# Patient Record
Sex: FEMALE | Race: WHITE | Employment: FULL TIME | URBAN - METROPOLITAN AREA
[De-identification: names, ages, dates, MRNs, and addresses within clinical notes are randomized per-mention and may not be internally consistent; named-entity substitution may affect disease eponyms.]

---

## 2017-12-01 ENCOUNTER — HOSPITAL ENCOUNTER (OUTPATIENT)
Dept: MAMMOGRAPHY | Age: 52
Discharge: HOME | End: 2017-12-01
Attending: OBSTETRICS & GYNECOLOGY
Payer: COMMERCIAL

## 2017-12-01 DIAGNOSIS — Z12.31 VISIT FOR SCREENING MAMMOGRAM: ICD-10-CM

## 2017-12-01 PROCEDURE — 77067 SCR MAMMO BI INCL CAD: CPT

## 2019-06-25 ENCOUNTER — HOSPITAL ENCOUNTER (OUTPATIENT)
Dept: MAMMOGRAPHY | Age: 54
Discharge: HOME | End: 2019-06-25
Attending: OBSTETRICS & GYNECOLOGY
Payer: COMMERCIAL

## 2019-06-25 DIAGNOSIS — Z12.31 VISIT FOR SCREENING MAMMOGRAM: ICD-10-CM

## 2019-06-25 PROCEDURE — 77067 SCR MAMMO BI INCL CAD: CPT

## 2019-06-28 NOTE — PROGRESS NOTES
Patient notified and she said she could not afford the MRI at this time. She said she will call back and schedule the test if it becomes possible for to afford the procedure.

## 2020-09-17 ENCOUNTER — HOSPITAL ENCOUNTER (OUTPATIENT)
Dept: MAMMOGRAPHY | Age: 55
Discharge: HOME | End: 2020-09-17
Attending: OBSTETRICS & GYNECOLOGY
Payer: COMMERCIAL

## 2020-09-17 DIAGNOSIS — Z12.31 VISIT FOR SCREENING MAMMOGRAM: ICD-10-CM

## 2020-09-17 PROCEDURE — 77067 SCR MAMMO BI INCL CAD: CPT

## 2020-10-08 NOTE — PROGRESS NOTES
Patient notified of results. MRI recommended but patient could not afford the test. She will let us know if she changes her mind.

## 2021-08-27 ENCOUNTER — TRANSCRIBE ORDER (OUTPATIENT)
Dept: SCHEDULING | Age: 56
End: 2021-08-27

## 2021-08-27 DIAGNOSIS — Z12.31 SCREENING MAMMOGRAM FOR HIGH-RISK PATIENT: Primary | ICD-10-CM

## 2021-10-22 ENCOUNTER — HOSPITAL ENCOUNTER (OUTPATIENT)
Dept: MAMMOGRAPHY | Age: 56
Discharge: HOME | End: 2021-10-22
Attending: OBSTETRICS & GYNECOLOGY
Payer: COMMERCIAL

## 2021-10-22 DIAGNOSIS — Z12.31 SCREENING MAMMOGRAM FOR HIGH-RISK PATIENT: ICD-10-CM

## 2021-10-22 PROCEDURE — 77063 BREAST TOMOSYNTHESIS BI: CPT

## 2022-10-25 ENCOUNTER — OFFICE VISIT (OUTPATIENT)
Dept: GYNECOLOGY | Age: 57
End: 2022-10-25
Payer: COMMERCIAL

## 2022-10-25 ENCOUNTER — HOSPITAL ENCOUNTER (OUTPATIENT)
Dept: MAMMOGRAPHY | Age: 57
Discharge: HOME | End: 2022-10-28
Payer: COMMERCIAL

## 2022-10-25 VITALS
BODY MASS INDEX: 26.29 KG/M2 | WEIGHT: 154 LBS | SYSTOLIC BLOOD PRESSURE: 122 MMHG | DIASTOLIC BLOOD PRESSURE: 80 MMHG | HEIGHT: 64 IN

## 2022-10-25 DIAGNOSIS — Z01.419 ENCOUNTER FOR WELL WOMAN EXAM WITH ROUTINE GYNECOLOGICAL EXAM: Primary | ICD-10-CM

## 2022-10-25 DIAGNOSIS — Z12.31 SCREENING MAMMOGRAM FOR HIGH-RISK PATIENT: ICD-10-CM

## 2022-10-25 DIAGNOSIS — Z12.4 CERVICAL CANCER SCREENING: ICD-10-CM

## 2022-10-25 PROCEDURE — 99396 PREV VISIT EST AGE 40-64: CPT | Performed by: OBSTETRICS & GYNECOLOGY

## 2022-10-25 PROCEDURE — 77063 BREAST TOMOSYNTHESIS BI: CPT

## 2022-10-25 RX ORDER — ASPIRIN 81 MG/1
81 TABLET ORAL
COMMUNITY

## 2022-11-02 LAB
CYTOLOGIST CVX/VAG CYTO: NORMAL
CYTOLOGY CVX/VAG DOC THIN PREP: NORMAL
HPV REFLEX: NORMAL
Lab: NORMAL
Lab: NORMAL
PATH REPORT.FINAL DX SPEC: NORMAL
STAT OF ADQ CVX/VAG CYTO-IMP: NORMAL

## 2023-10-09 ENCOUNTER — TRANSCRIBE ORDERS (OUTPATIENT)
Dept: SCHEDULING | Age: 58
End: 2023-10-09

## 2023-10-09 DIAGNOSIS — Z12.31 SCREENING MAMMOGRAM FOR BREAST CANCER: Primary | ICD-10-CM

## 2023-10-24 NOTE — PROGRESS NOTES
HPI Paz Boeck is a 59 y.o. female seen for annual GYN exam.    Past Medical History, Past Surgical History, Family history, Social History, and Medications were all reviewed with the patient today and updated as necessary. Current Outpatient Medications   Medication Sig    aspirin 81 MG EC tablet Take 1 tablet by mouth    amLODIPine (NORVASC) 5 MG tablet AmLODIPine Besylate 5 MG Oral TabletTAKE 1 TABLET DAILY FOR BLOOD PRESSURE. Quantity: 90; Refills: 3 Gregory Bane M.D.; Active    atorvastatin (LIPITOR) 40 MG tablet Atorvastatin Calcium 40 MG Oral TabletTAKE 1 TABLET DAILY AT BEDTIME. for cholesterol Quantity: 90; Refills: 3 Erie Bane M.D.; Started [id number]    cetirizine (ZYRTEC) 10 MG tablet Take 1 tablet by mouth    fluticasone (FLONASE) 50 MCG/ACT nasal spray Fluticasone Propionate 50 MCG/ACT Nasal SuspensionSPRAY 2 SPRAYS IN BOTH NOSTRILS DAILY prn sinus Quantity: 1; Refills: 4 Erie Bane M.D.; Started [id number] GM Bottle    lisinopril-hydroCHLOROthiazide (PRINZIDE;ZESTORETIC) 20-12.5 MG per tablet Lisinopril-Hydrochlorothiazide 20-12.5 MG Oral TabletTAKE 1 TABLET EVERY MORNINGfor bp Quantity: 90; Refills: 3 Erie Bane M.D.; Active    metoprolol tartrate (LOPRESSOR) 50 MG tablet Metoprolol Tartrate 50 MG Oral TabletTAKE 1 TABLET TWICE DAILY. For bp Quantity: 180; Refills: 3 Erie Bane M.D.; Active     No current facility-administered medications for this visit.      No Known Allergies  Past Medical History:   Diagnosis Date    Abnormal Pap smear of cervix     Borderline diabetes     High cholesterol     Hypertension     Seasonal allergic rhinitis      Past Surgical History:   Procedure Laterality Date    COLPOSCOPY      > 10 years    GYN      Endometrial Ablation    GYN          HEENT      Tonsillectomy    OTHER SURGICAL HISTORY Right     Breast Aspiration     Family History   Problem Relation Age of Onset    Breast Cancer Mother 61    Cancer Father

## 2023-10-26 ENCOUNTER — OFFICE VISIT (OUTPATIENT)
Dept: OBGYN CLINIC | Age: 58
End: 2023-10-26
Payer: COMMERCIAL

## 2023-10-26 VITALS
DIASTOLIC BLOOD PRESSURE: 62 MMHG | SYSTOLIC BLOOD PRESSURE: 106 MMHG | WEIGHT: 163 LBS | HEIGHT: 65 IN | BODY MASS INDEX: 27.16 KG/M2

## 2023-10-26 DIAGNOSIS — Z01.419 WELL WOMAN EXAM: Primary | ICD-10-CM

## 2023-10-26 DIAGNOSIS — Z12.4 SCREENING FOR MALIGNANT NEOPLASM OF CERVIX: ICD-10-CM

## 2023-10-26 PROCEDURE — 99396 PREV VISIT EST AGE 40-64: CPT | Performed by: OBSTETRICS & GYNECOLOGY

## 2023-10-31 LAB
CYTOLOGIST CVX/VAG CYTO: NORMAL
CYTOLOGY CVX/VAG DOC THIN PREP: NORMAL
HPV REFLEX: NORMAL
Lab: NORMAL
PATH REPORT.FINAL DX SPEC: NORMAL
STAT OF ADQ CVX/VAG CYTO-IMP: NORMAL

## 2023-11-30 ENCOUNTER — HOSPITAL ENCOUNTER (OUTPATIENT)
Dept: MAMMOGRAPHY | Age: 58
Discharge: HOME | End: 2023-11-30
Attending: OBSTETRICS & GYNECOLOGY
Payer: COMMERCIAL

## 2023-11-30 DIAGNOSIS — Z12.31 SCREENING MAMMOGRAM FOR BREAST CANCER: ICD-10-CM

## 2023-11-30 PROCEDURE — 77063 BREAST TOMOSYNTHESIS BI: CPT

## 2024-11-13 ENCOUNTER — TRANSCRIBE ORDERS (OUTPATIENT)
Dept: SCHEDULING | Age: 59
End: 2024-11-13

## 2024-11-13 DIAGNOSIS — Z12.31 OTHER SCREENING MAMMOGRAM: Primary | ICD-10-CM

## 2024-12-10 NOTE — PROGRESS NOTES
HPI    Rachael Whitt is a 59 y.o. female seen for annual GYN exam.  She c/o fatigue    Past Medical History, Past Surgical History, Family history, Social History, and Medications were all reviewed with the patient today and updated as necessary.     Current Outpatient Medications   Medication Sig    JARDIANCE 25 MG tablet Take 1 tablet by mouth daily    mometasone (NASONEX) 50 MCG/ACT nasal spray USE 2 SPRAY(S) IN EACH NOSTRIL ONCE DAILY    amLODIPine (NORVASC) 10 MG tablet Take 1 tablet by mouth daily high blood pressure    aspirin 81 MG EC tablet Take 1 tablet by mouth    atorvastatin (LIPITOR) 40 MG tablet Atorvastatin Calcium 40 MG Oral TabletTAKE 1 TABLET DAILY AT BEDTIME.for cholesterol Quantity: 90; Refills: 3 Alfred Harden M.D.; Started [id number]    cetirizine (ZYRTEC) 10 MG tablet Take 1 tablet by mouth    fluticasone (FLONASE) 50 MCG/ACT nasal spray Fluticasone Propionate 50 MCG/ACT Nasal SuspensionSPRAY 2 SPRAYS IN BOTH NOSTRILS DAILY prn sinus Quantity: 1; Refills: 4 Alfred Harden M.D.; Started [id number] GM Bottle    lisinopril-hydroCHLOROthiazide (PRINZIDE;ZESTORETIC) 20-12.5 MG per tablet Lisinopril-Hydrochlorothiazide 20-12.5 MG Oral TabletTAKE 1 TABLET EVERY MORNINGfor bp Quantity: 90; Refills: 3 Alfred Harden M.D.; Active    metoprolol tartrate (LOPRESSOR) 50 MG tablet Metoprolol Tartrate 50 MG Oral TabletTAKE 1 TABLET TWICE DAILY. For bp Quantity: 180; Refills: 3 Alfred Harden M.D.; Active    amLODIPine (NORVASC) 5 MG tablet AmLODIPine Besylate 5 MG Oral TabletTAKE 1 TABLET DAILY FOR BLOOD PRESSURE. Quantity: 90; Refills: 3 Alfred Harden M.D.; Active (Patient not taking: Reported on 12/12/2024)     No current facility-administered medications for this visit.     No Known Allergies  Past Medical History:   Diagnosis Date    Abnormal Pap smear of cervix     Borderline diabetes     High cholesterol     Hypertension     Seasonal allergic rhinitis      Past Surgical History:

## 2024-12-11 SDOH — ECONOMIC STABILITY: FOOD INSECURITY: WITHIN THE PAST 12 MONTHS, YOU WORRIED THAT YOUR FOOD WOULD RUN OUT BEFORE YOU GOT MONEY TO BUY MORE.: NEVER TRUE

## 2024-12-11 SDOH — ECONOMIC STABILITY: TRANSPORTATION INSECURITY
IN THE PAST 12 MONTHS, HAS LACK OF TRANSPORTATION KEPT YOU FROM MEETINGS, WORK, OR FROM GETTING THINGS NEEDED FOR DAILY LIVING?: NO

## 2024-12-11 SDOH — ECONOMIC STABILITY: FOOD INSECURITY: WITHIN THE PAST 12 MONTHS, THE FOOD YOU BOUGHT JUST DIDN'T LAST AND YOU DIDN'T HAVE MONEY TO GET MORE.: NEVER TRUE

## 2024-12-11 SDOH — ECONOMIC STABILITY: INCOME INSECURITY: HOW HARD IS IT FOR YOU TO PAY FOR THE VERY BASICS LIKE FOOD, HOUSING, MEDICAL CARE, AND HEATING?: NOT VERY HARD

## 2024-12-12 ENCOUNTER — OFFICE VISIT (OUTPATIENT)
Dept: OBGYN CLINIC | Age: 59
End: 2024-12-12
Payer: COMMERCIAL

## 2024-12-12 VITALS
DIASTOLIC BLOOD PRESSURE: 80 MMHG | WEIGHT: 169 LBS | HEIGHT: 65 IN | SYSTOLIC BLOOD PRESSURE: 122 MMHG | BODY MASS INDEX: 28.16 KG/M2

## 2024-12-12 DIAGNOSIS — R53.83 FATIGUE, UNSPECIFIED TYPE: ICD-10-CM

## 2024-12-12 DIAGNOSIS — Z01.419 ENCOUNTER FOR WELL WOMAN EXAM WITH ROUTINE GYNECOLOGICAL EXAM: Primary | ICD-10-CM

## 2024-12-12 DIAGNOSIS — Z12.4 ROUTINE CERVICAL SMEAR: ICD-10-CM

## 2024-12-12 PROCEDURE — 99396 PREV VISIT EST AGE 40-64: CPT | Performed by: OBSTETRICS & GYNECOLOGY

## 2024-12-12 PROCEDURE — 99459 PELVIC EXAMINATION: CPT | Performed by: OBSTETRICS & GYNECOLOGY

## 2024-12-12 RX ORDER — MOMETASONE FUROATE MONOHYDRATE 50 UG/1
SPRAY, METERED NASAL
COMMUNITY

## 2024-12-12 RX ORDER — EMPAGLIFLOZIN 25 MG/1
25 TABLET, FILM COATED ORAL DAILY
COMMUNITY

## 2024-12-12 RX ORDER — AMLODIPINE BESYLATE 10 MG/1
10 TABLET ORAL DAILY
COMMUNITY
Start: 2024-11-04

## 2024-12-20 LAB
COLLECTION METHOD: NORMAL
CYTOLOGIST CVX/VAG CYTO: NORMAL
CYTOLOGY CVX/VAG DOC THIN PREP: NORMAL
HPV REFLEX: NORMAL
Lab: NORMAL
OTHER PT INFO: NORMAL
PAP SOURCE: NORMAL
PATH REPORT.FINAL DX SPEC: NORMAL
PREV TREATMENT: NORMAL
STAT OF ADQ CVX/VAG CYTO-IMP: NORMAL

## 2025-01-04 ENCOUNTER — HOSPITAL ENCOUNTER (OUTPATIENT)
Dept: MAMMOGRAPHY | Age: 60
End: 2025-01-04
Payer: COMMERCIAL

## 2025-01-04 VITALS — HEIGHT: 64 IN | WEIGHT: 167 LBS | BODY MASS INDEX: 28.51 KG/M2

## 2025-01-04 DIAGNOSIS — Z12.31 OTHER SCREENING MAMMOGRAM: ICD-10-CM

## 2025-01-04 PROCEDURE — 77063 BREAST TOMOSYNTHESIS BI: CPT

## 2025-01-04 PROCEDURE — 77067 SCR MAMMO BI INCL CAD: CPT
